# Patient Record
Sex: FEMALE | Race: WHITE | NOT HISPANIC OR LATINO | Employment: OTHER | URBAN - METROPOLITAN AREA
[De-identification: names, ages, dates, MRNs, and addresses within clinical notes are randomized per-mention and may not be internally consistent; named-entity substitution may affect disease eponyms.]

---

## 2017-07-31 PROBLEM — M81.0 OSTEOPOROSIS: Status: ACTIVE | Noted: 2017-07-31

## 2018-03-26 ENCOUNTER — TELEPHONE (OUTPATIENT)
Dept: FAMILY MEDICINE | Facility: CLINIC | Age: 83
End: 2018-03-26

## 2018-03-26 NOTE — TELEPHONE ENCOUNTER
----- Message from Lata Escobar sent at 3/26/2018 10:51 AM CDT -----  /Sterling is calling to talk to office concerning patient. He would like to talk to doctor for five minutes concerning patient. I did tell him that office was not accepting new patients but  is insisting that I send this message. Please call back to nikole at [phone].

## 2018-03-29 NOTE — TELEPHONE ENCOUNTER
"Returned pt husbands call. She has never seen Dr. Ruelas.  states he only would like to speak to provider as his wife case is "complicated". He would like Dr. Ruelas himself to call him to see if he is willing to see his wife.      "

## 2018-03-29 NOTE — TELEPHONE ENCOUNTER
----- Message from Saadia Ortiz sent at 3/29/2018 12:58 PM CDT -----  Contact: ,Sterling Zazueta  Patient's ,Sterling Amosalled asking for advice about a severe back issue.  Please call patient's  at [phone] or [phone]. Thanks!

## 2018-04-16 ENCOUNTER — PES CALL (OUTPATIENT)
Dept: ADMINISTRATIVE | Facility: CLINIC | Age: 83
End: 2018-04-16

## 2018-05-16 ENCOUNTER — OFFICE VISIT (OUTPATIENT)
Dept: FAMILY MEDICINE | Facility: CLINIC | Age: 83
End: 2018-05-16
Payer: MEDICARE

## 2018-05-16 VITALS
SYSTOLIC BLOOD PRESSURE: 116 MMHG | DIASTOLIC BLOOD PRESSURE: 60 MMHG | OXYGEN SATURATION: 97 % | BODY MASS INDEX: 24.62 KG/M2 | HEIGHT: 64 IN | WEIGHT: 144.19 LBS | HEART RATE: 67 BPM

## 2018-05-16 DIAGNOSIS — M54.9 CHRONIC BACK PAIN, UNSPECIFIED BACK LOCATION, UNSPECIFIED BACK PAIN LATERALITY: Chronic | ICD-10-CM

## 2018-05-16 DIAGNOSIS — M85.80 OSTEOPENIA, UNSPECIFIED LOCATION: ICD-10-CM

## 2018-05-16 DIAGNOSIS — N18.4 CKD (CHRONIC KIDNEY DISEASE) STAGE 4, GFR 15-29 ML/MIN: Chronic | ICD-10-CM

## 2018-05-16 DIAGNOSIS — M41.9 SCOLIOSIS OF LUMBAR SPINE, UNSPECIFIED SCOLIOSIS TYPE: ICD-10-CM

## 2018-05-16 DIAGNOSIS — M16.10 ARTHRITIS OF HIP: ICD-10-CM

## 2018-05-16 DIAGNOSIS — E78.5 HYPERLIPIDEMIA, UNSPECIFIED HYPERLIPIDEMIA TYPE: Chronic | ICD-10-CM

## 2018-05-16 DIAGNOSIS — M47.812 CERVICAL SPINE ARTHRITIS: ICD-10-CM

## 2018-05-16 DIAGNOSIS — I10 ESSENTIAL HYPERTENSION: Chronic | ICD-10-CM

## 2018-05-16 DIAGNOSIS — Z00.00 ENCOUNTER FOR PREVENTIVE HEALTH EXAMINATION: Primary | ICD-10-CM

## 2018-05-16 DIAGNOSIS — R41.3 MEMORY LOSS: ICD-10-CM

## 2018-05-16 DIAGNOSIS — M80.00XA AGE-RELATED OSTEOPOROSIS WITH CURRENT PATHOLOGICAL FRACTURE, INITIAL ENCOUNTER: ICD-10-CM

## 2018-05-16 DIAGNOSIS — I48.91 ATRIAL FIBRILLATION, UNSPECIFIED TYPE: ICD-10-CM

## 2018-05-16 DIAGNOSIS — G89.29 CHRONIC BACK PAIN, UNSPECIFIED BACK LOCATION, UNSPECIFIED BACK PAIN LATERALITY: Chronic | ICD-10-CM

## 2018-05-16 DIAGNOSIS — R41.89 COGNITIVE DECLINE: Chronic | ICD-10-CM

## 2018-05-16 DIAGNOSIS — M47.816 OSTEOARTHRITIS OF LUMBAR SPINE, UNSPECIFIED SPINAL OSTEOARTHRITIS COMPLICATION STATUS: ICD-10-CM

## 2018-05-16 PROCEDURE — G0439 PPPS, SUBSEQ VISIT: HCPCS | Mod: S$GLB,,, | Performed by: NURSE PRACTITIONER

## 2018-05-16 PROCEDURE — 99499 UNLISTED E&M SERVICE: CPT | Mod: S$GLB,,, | Performed by: NURSE PRACTITIONER

## 2018-05-16 PROCEDURE — 99999 PR PBB SHADOW E&M-EST. PATIENT-LVL V: CPT | Mod: PBBFAC,,, | Performed by: NURSE PRACTITIONER

## 2018-05-16 RX ORDER — TRAMADOL HYDROCHLORIDE AND ACETAMINOPHEN 37.5; 325 MG/1; MG/1
1 TABLET, FILM COATED ORAL EVERY 12 HOURS
COMMUNITY
End: 2018-09-24

## 2018-05-16 RX ORDER — LEVOTHYROXINE SODIUM 50 UG/1
50 TABLET ORAL DAILY
COMMUNITY

## 2018-05-16 RX ORDER — CINACALCET 30 MG/1
30 TABLET, FILM COATED ORAL
COMMUNITY
End: 2018-08-06 | Stop reason: ALTCHOICE

## 2018-05-16 RX ORDER — CEPHALEXIN 250 MG/1
250 CAPSULE ORAL
COMMUNITY

## 2018-05-16 RX ORDER — DONEPEZIL HYDROCHLORIDE 10 MG/1
10 TABLET, FILM COATED ORAL NIGHTLY
COMMUNITY
End: 2018-09-17 | Stop reason: SDUPTHER

## 2018-05-16 NOTE — PATIENT INSTRUCTIONS
Counseling and Referral of Other Preventative  (Italic type indicates deductible and co-insurance are waived)    Patient Name: Leigh Ann Zazueta  Today's Date: 5/16/2018    Health Maintenance       Date Due Completion Date    Zoster Vaccine 09/03/1990 ---    Pneumococcal (65+) (1 of 2 - PCV13) 09/03/1995 ---    Influenza Vaccine 08/01/2018 10/18/2016 (Done)    Override on 10/18/2016: Done (Done at [facility])    Lipid Panel 04/10/2022 4/10/2017    TETANUS VACCINE 02/07/2028 2/7/2018        No orders of the defined types were placed in this encounter.    The following information is provided to all patients.  This information is to help you find resources for any of the problems found today that may be affecting your health:                Living healthy guide: www.[facility].[place].gov      Understanding Diabetes: www.diabetes.org      Eating healthy: www.cdc.gov/healthyweight      CDC home safety checklist: www.cdc.gov/steadi/patient.html      Agency on Aging: www.goea.[place].[facility]      Alcoholics anonymous (AA): www.aa.org      Physical Activity: www.abigail.nih.gov/ji3yqis      Tobacco use: www.quitwithusla.org

## 2018-05-16 NOTE — PROGRESS NOTES
"Leigh Ann Zazueta presented for a  Medicare AWV and comprehensive Health Risk Assessment today. The following components were reviewed and updated:    · Medical history  · Family History  · Social history  · Allergies and Current Medications  · Health Risk Assessment  · Health Maintenance  · Care Team     ** See Completed Assessments for Annual Wellness Visit within the encounter summary.**       The following assessments were completed:  · Living Situation  · CAGE  · Depression Screening  · Timed Get Up and Go  · Whisper Test  · Cognitive Function Screening  · Nutrition Screening  · ADL Screening  · PAQ Screening    Vitals:    05/16/18 1125   BP: 116/60   BP Location: Left arm   Patient Position: Sitting   BP Method: Medium (Manual)   Pulse: 67   SpO2: 97%   Weight: 65.4 kg (144 lb 2.9 oz)   Height: 5' 4" (1.626 m)     Body mass index is 24.75 kg/m².  Physical Exam   Constitutional: She appears well-nourished. No distress.   Neck: Carotid bruit is not present.   Cardiovascular: Normal rate and regular rhythm.    No murmur heard.  Pulmonary/Chest: Effort normal and breath sounds normal. No respiratory distress.   Musculoskeletal: She exhibits no edema.   Neurological: She is alert. No cranial nerve deficit.   Skin: Skin is warm and dry.   Psychiatric: She has a normal mood and affect. She exhibits abnormal recent memory.         Diagnoses and health risks identified today and associated recommendations/orders:    1. Encounter for preventive health examination  Reviewed health maintenance and provided recommendations   Recommend pcv13 and shingrix, pt daughter will check with Dr Ramírez    2. Chronic back pain, unspecified back location, unspecified back pain laterality  Continue to monitor   Followed by Bentley.    - Ambulatory Referral to Physical/Occupational Therapy    3. Osteoarthritis of lumbar spine, unspecified spinal osteoarthritis complication status  Continue to monitor   Followed by Abraham Byrd MD " .    - Ambulatory Referral to Physical/Occupational Therapy    4. Scoliosis of lumbar spine, unspecified scoliosis type  Continue to monitor   Followed by Abraham Byrd MD .      5. Memory loss  Taking aricept and namenda  Followed by Wei.      6. Cognitive decline  Continue to monitor   Followed by Wei.      7. Atrial fibrillation, unspecified type  Taking amiodarone  Followed by Jodi.      8. Essential hypertension  Stable.     Followed by Jodi.      9. Hyperlipidemia, unspecified hyperlipidemia type  Continue to monitor   Followed by Jodi.      10. CKD (chronic kidney disease) stage 4, GFR 15-29 ml/min  Continue to monitor   Continue to encourage H2o intake  Followed by Jim.      11. Arthritis of hip  Stable.     Followed by Abraham Byrd MD .      12. Cervical spine arthritis  Continue to monitor   Followed by Abraham Byrd MD .      13. Osteopenia, unspecified location  Continue to monitor   Taking ca + vit d  Followed by Abraham Byrd MD .      14. Age-related osteoporosis with current pathological fracture, initial encounter  Taking Ca + Vit D  Followed by Abraham Byrd MD .      Daughter reports pt has back pain after strenuous activity and requests referral for PT.    Provided Leigh Ann with a 5-10 year written screening schedule and personal prevention plan. Recommendations were developed using the USPSTF age appropriate recommendations. Education, counseling, and referrals were provided as needed. After Visit Summary printed and given to patient which includes a list of additional screenings\tests needed.    Follow-up in about 1 year (around 5/16/2019).    Kaity Mcneill NP

## 2018-05-25 ENCOUNTER — TELEPHONE (OUTPATIENT)
Dept: FAMILY MEDICINE | Facility: CLINIC | Age: 83
End: 2018-05-25

## 2018-05-25 NOTE — TELEPHONE ENCOUNTER
----- Message from Leilani Winter sent at 5/25/2018 10:26 AM CDT -----  Contact:  Sterling  Requesting a call back from Marlene Mata in regards to his wife.  Doctor Thaddeus is referring them to Dr Monahan and he wants to discuss her issues.  Call back at [phone].  Thank you!

## 2018-05-31 NOTE — TELEPHONE ENCOUNTER
Tried to reach Mr Zazueta. Left msg for him to call back. Please give call to Marlene when he calls back.

## 2018-06-05 NOTE — TELEPHONE ENCOUNTER
Spoke w/ pt's . Appt made to estab care w/ Dr Monahan on 6/21/18 at 230pm, and advised to check in 15 min early. He repeated confirmation. Appt slip mailed.

## 2018-06-21 ENCOUNTER — OFFICE VISIT (OUTPATIENT)
Dept: FAMILY MEDICINE | Facility: CLINIC | Age: 83
End: 2018-06-21
Payer: MEDICARE

## 2018-06-21 ENCOUNTER — LAB VISIT (OUTPATIENT)
Dept: LAB | Facility: HOSPITAL | Age: 83
End: 2018-06-21
Attending: FAMILY MEDICINE
Payer: MEDICARE

## 2018-06-21 VITALS
BODY MASS INDEX: 24.32 KG/M2 | WEIGHT: 142.44 LBS | HEART RATE: 70 BPM | DIASTOLIC BLOOD PRESSURE: 56 MMHG | SYSTOLIC BLOOD PRESSURE: 104 MMHG | HEIGHT: 64 IN | TEMPERATURE: 99 F | OXYGEN SATURATION: 95 %

## 2018-06-21 DIAGNOSIS — M41.9 SCOLIOSIS OF LUMBAR SPINE, UNSPECIFIED SCOLIOSIS TYPE: ICD-10-CM

## 2018-06-21 DIAGNOSIS — N18.4 CKD (CHRONIC KIDNEY DISEASE) STAGE 4, GFR 15-29 ML/MIN: Chronic | ICD-10-CM

## 2018-06-21 DIAGNOSIS — G89.29 CHRONIC BACK PAIN, UNSPECIFIED BACK LOCATION, UNSPECIFIED BACK PAIN LATERALITY: Chronic | ICD-10-CM

## 2018-06-21 DIAGNOSIS — I48.91 ATRIAL FIBRILLATION, UNSPECIFIED TYPE: ICD-10-CM

## 2018-06-21 DIAGNOSIS — E07.9 THYROID DISORDER: ICD-10-CM

## 2018-06-21 DIAGNOSIS — R41.89 COGNITIVE DECLINE: Chronic | ICD-10-CM

## 2018-06-21 DIAGNOSIS — D64.9 ANEMIA, UNSPECIFIED TYPE: ICD-10-CM

## 2018-06-21 DIAGNOSIS — M47.816 OSTEOARTHRITIS OF LUMBAR SPINE, UNSPECIFIED SPINAL OSTEOARTHRITIS COMPLICATION STATUS: ICD-10-CM

## 2018-06-21 DIAGNOSIS — I10 ESSENTIAL HYPERTENSION: Chronic | ICD-10-CM

## 2018-06-21 DIAGNOSIS — Z00.00 HEALTH CARE MAINTENANCE: Primary | ICD-10-CM

## 2018-06-21 DIAGNOSIS — M54.9 CHRONIC BACK PAIN, UNSPECIFIED BACK LOCATION, UNSPECIFIED BACK PAIN LATERALITY: Chronic | ICD-10-CM

## 2018-06-21 LAB
ALBUMIN SERPL BCP-MCNC: 3.7 G/DL
ALP SERPL-CCNC: 66 U/L
ALT SERPL W/O P-5'-P-CCNC: 10 U/L
ANION GAP SERPL CALC-SCNC: 6 MMOL/L
AST SERPL-CCNC: 18 U/L
BASOPHILS # BLD AUTO: 0.06 K/UL
BASOPHILS NFR BLD: 0.9 %
BILIRUB SERPL-MCNC: 0.5 MG/DL
BUN SERPL-MCNC: 25 MG/DL
CALCIUM SERPL-MCNC: 9.6 MG/DL
CHLORIDE SERPL-SCNC: 104 MMOL/L
CO2 SERPL-SCNC: 28 MMOL/L
CREAT SERPL-MCNC: 1.8 MG/DL
DIFFERENTIAL METHOD: ABNORMAL
EOSINOPHIL # BLD AUTO: 0.4 K/UL
EOSINOPHIL NFR BLD: 5.3 %
ERYTHROCYTE [DISTWIDTH] IN BLOOD BY AUTOMATED COUNT: 13.3 %
EST. GFR  (AFRICAN AMERICAN): 28.8 ML/MIN/1.73 M^2
EST. GFR  (NON AFRICAN AMERICAN): 24.9 ML/MIN/1.73 M^2
GLUCOSE SERPL-MCNC: 96 MG/DL
HCT VFR BLD AUTO: 41.1 %
HGB BLD-MCNC: 13.4 G/DL
IMM GRANULOCYTES # BLD AUTO: 0.03 K/UL
IMM GRANULOCYTES NFR BLD AUTO: 0.5 %
IRON SERPL-MCNC: 91 UG/DL
LYMPHOCYTES # BLD AUTO: 1.5 K/UL
LYMPHOCYTES NFR BLD: 22.4 %
MCH RBC QN AUTO: 33.8 PG
MCHC RBC AUTO-ENTMCNC: 32.6 G/DL
MCV RBC AUTO: 104 FL
MONOCYTES # BLD AUTO: 0.6 K/UL
MONOCYTES NFR BLD: 8.4 %
NEUTROPHILS # BLD AUTO: 4.1 K/UL
NEUTROPHILS NFR BLD: 62.5 %
NRBC BLD-RTO: 0 /100 WBC
PLATELET # BLD AUTO: 156 K/UL
PMV BLD AUTO: 10.6 FL
POTASSIUM SERPL-SCNC: 4.1 MMOL/L
PROT SERPL-MCNC: 6.9 G/DL
RBC # BLD AUTO: 3.97 M/UL
SATURATED IRON: 25 %
SODIUM SERPL-SCNC: 138 MMOL/L
TOTAL IRON BINDING CAPACITY: 360 UG/DL
TRANSFERRIN SERPL-MCNC: 243 MG/DL
TSH SERPL DL<=0.005 MIU/L-ACNC: 1.47 UIU/ML
WBC # BLD AUTO: 6.57 K/UL

## 2018-06-21 PROCEDURE — 83540 ASSAY OF IRON: CPT

## 2018-06-21 PROCEDURE — 85025 COMPLETE CBC W/AUTO DIFF WBC: CPT

## 2018-06-21 PROCEDURE — 84443 ASSAY THYROID STIM HORMONE: CPT

## 2018-06-21 PROCEDURE — 99214 OFFICE O/P EST MOD 30 MIN: CPT | Mod: S$GLB,,, | Performed by: FAMILY MEDICINE

## 2018-06-21 PROCEDURE — 80053 COMPREHEN METABOLIC PANEL: CPT

## 2018-06-21 PROCEDURE — 99499 UNLISTED E&M SERVICE: CPT | Mod: S$GLB,,, | Performed by: FAMILY MEDICINE

## 2018-06-21 PROCEDURE — 36415 COLL VENOUS BLD VENIPUNCTURE: CPT | Mod: PN

## 2018-06-21 PROCEDURE — 99999 PR PBB SHADOW E&M-EST. PATIENT-LVL III: CPT | Mod: PBBFAC,,, | Performed by: FAMILY MEDICINE

## 2018-06-21 RX ORDER — AMLODIPINE BESYLATE 5 MG/1
5 TABLET ORAL DAILY
COMMUNITY
End: 2018-09-10 | Stop reason: SDUPTHER

## 2018-06-21 RX ORDER — TALC
POWDER (GRAM) TOPICAL NIGHTLY
COMMUNITY

## 2018-06-21 RX ORDER — IRBESARTAN 300 MG/1
300 TABLET ORAL NIGHTLY
COMMUNITY
End: 2018-09-28 | Stop reason: SDUPTHER

## 2018-06-21 RX ORDER — TRAMADOL HYDROCHLORIDE 50 MG/1
TABLET ORAL
COMMUNITY
Start: 2018-06-18 | End: 2018-06-21 | Stop reason: SDUPTHER

## 2018-06-21 RX ORDER — MEMANTINE HYDROCHLORIDE 14 MG/1
CAPSULE, EXTENDED RELEASE ORAL DAILY
COMMUNITY

## 2018-06-21 RX ORDER — CHOLECALCIFEROL (VITAMIN D3) 25 MCG
2 TABLET,CHEWABLE ORAL DAILY
COMMUNITY
End: 2018-09-24

## 2018-06-21 RX ORDER — VIT C/E/ZN/COPPR/LUTEIN/ZEAXAN 250MG-90MG
5000 CAPSULE ORAL DAILY
COMMUNITY

## 2018-06-21 RX ORDER — DOXAZOSIN 2 MG/1
2 TABLET ORAL NIGHTLY
COMMUNITY
End: 2018-08-06 | Stop reason: SDUPTHER

## 2018-06-21 NOTE — PROGRESS NOTES
Subjective:       Patient ID: Leigh Ann Zazueta is a 87 y.o. female.    Chief Complaint: Annual Exam    HPI   Leigh Ann Zazueta is an 88yo female here today to establish care. Her  notes that she has a a history of memory loss and reports she was diagnosed with Alzheimer's. She sees Dr Aly Schrader and has been on aricept and namenda.  Not sure how much it is helping. She reports feeling well overall and having no complaints. She does ask repeated questions throughout the visit today. Her  also notes that she complains of hip pain and occasional back pain though she does not seem to recall this. She has chronic back issues and had a fusion of the cervical spine. She denies any pain today. She is very pleasant but history is difficult to obtain due to her memory difficulties and her  is not able to answer some of the questions. One of their six children helps with their healthcare but she is not present today. Reviewed PMHx, PSHx, FHx, and social history with the patient today.     HTN - stable, does not check her BP    Atrial Fibrillation - follows with Dr. Zapata, currently asymptomatic    CKD - follows with Dr. Fernandez    Chronic Back Pain - history of cervical spine fusion and scoliosis. Reports no pain at this time.    Review of Systems   Constitutional: Negative for chills, fatigue and fever.   HENT: Negative for congestion and rhinorrhea.    Eyes: Negative for visual disturbance.   Respiratory: Negative for cough and shortness of breath.    Cardiovascular: Negative for chest pain, palpitations and leg swelling.   Gastrointestinal: Negative for abdominal pain, constipation, diarrhea and nausea.   Genitourinary: Negative for difficulty urinating, frequency and urgency.   Musculoskeletal: Negative for arthralgias, back pain, myalgias and neck pain.   Neurological: Negative for dizziness, weakness, light-headedness, numbness and headaches.       Objective:       Vitals:    06/21/18 1448   BP: (!)  104/56   Pulse: 70   Temp: 98.7 °F (37.1 °C)       Physical Exam   Constitutional: She appears well-developed and well-nourished. No distress.   HENT:   Head: Normocephalic and atraumatic.   Mouth/Throat: Oropharynx is clear and moist. No oropharyngeal exudate.   TMs visualized, non-erythematous, non-bulging, non-retracting.   Eyes: Conjunctivae are normal. Pupils are equal, round, and reactive to light.   Neck: Normal range of motion. Neck supple.   Cardiovascular: Normal rate, regular rhythm, normal heart sounds and intact distal pulses.    Pulmonary/Chest: Effort normal and breath sounds normal. She has no wheezes. She has no rales.   Abdominal: Soft. Bowel sounds are normal. She exhibits no distension. There is no tenderness.   Musculoskeletal: She exhibits no edema or deformity.   Spinal Exam: Normal gait. Scoliosis noted with posture. Non-tender to palpation and percussion of the spine or paraspinals. ROM is grossly intact. Straight leg raise negative.   Hip Exam: No tenderness to palpation. ROM is grossly intact. She reports no pain on exam.    Lymphadenopathy:     She has no cervical adenopathy.   Neurological: She is alert.   Patient is not oriented to time. She knows she is in the doctor's office but cannot state the town.   Skin: Skin is warm and dry. She is not diaphoretic. No erythema.   Psychiatric: She has a normal mood and affect. Her behavior is normal.       Assessment:       1. Health care maintenance    2. Essential hypertension    3. CKD (chronic kidney disease) stage 4, GFR 15-29 ml/min    4. Atrial fibrillation, unspecified type    5. Chronic back pain, unspecified back location, unspecified back pain laterality    6. Thyroid disorder    7. Anemia, unspecified type    8. Cognitive decline        Plan:       1. Health care maintenance  Patient is here to establish care today.   Health Maintenance UTD.  Immunizations UTD - except pneumoccocal and zoster, will hold off for now and discuss at next  appointment   Will obtain labs today including CBC, CMP, TSH, and Iron panel and evaluate.     2. Essential hypertension  Stable in office today. Blood pressure has been on the low end at her last two visits.  Will cease doxazosin for now and determine need at next appointment.     3. CKD (chronic kidney disease) stage 4, GFR 15-29 ml/min  Follows with Dr. Fernandez. Improved on previous labwork with Cr of 1.37 and GFR of 35.   Will obtain a CMP today to evaluate.   - Comprehensive metabolic panel; Future    4. Atrial fibrillation, unspecified type  Follows with Dr. Zapata.   Continue home medications - Amiodarone 100mg and ASA 81.     5. Chronic back pain, unspecified back location, unspecified back pain laterality  Patient with history of back pain. Reports none at this time.     6. Thyroid disorder  Patient with history of thyroid disorder. Currently on levothyroxine 50mcg.   Will obtain repeat TSH.   - TSH; Future    7. Anemia, unspecified type  Patient with Hemoglobin of 10.4, previous iron panel and ferritin unremarkable.  Will obtain repeat CBC and iron panel to further evaluate. Will obtain FitKit to evaluate for a possible cause.   - CBC auto differential; Future  - Iron and TIBC; Future  - Fecal Immunochemical Test (iFOBT); Future    8. Memory Loss  Chronic ongoing issue per patient's .   Currently on Donepezil 10mg and Namenda XR 14mg.  I spoke with the daughter, Carmen.  Her mom had a knee arthroscope and a total hip replacement shortly after her spinal fusion.  She thinks she has been on Namenda and Aricept approximately 2 years and it seems to have helped with her agitation and anxiety but not with her memory.  The home health aide does acknowledge that she has some pain. She benefits from the tramadol twice a day.  She does have a history of spinal stenosis.  The Keflex twice a week is for prophylaxis for frequent urinary infections.  She has had an iron infusion in the past.  We discussed the  stopping of the doxazosin.

## 2018-06-27 ENCOUNTER — TELEPHONE (OUTPATIENT)
Dept: FAMILY MEDICINE | Facility: CLINIC | Age: 83
End: 2018-06-27

## 2018-06-27 NOTE — TELEPHONE ENCOUNTER
I discussed lab results with her daughter.  Everything seemed okay except for macrocytosis and an elevated creatinine of 1.8.  Her low blood pressure and maybe some dehydration may be influencing that.  We should repeat a BMP in 1 month.  She should continue on vitamin B12 supplements daily.  I will send the lab results in a letter

## 2018-09-24 ENCOUNTER — OFFICE VISIT (OUTPATIENT)
Dept: FAMILY MEDICINE | Facility: CLINIC | Age: 83
End: 2018-09-24
Payer: MEDICARE

## 2018-09-24 VITALS
TEMPERATURE: 98 F | HEART RATE: 66 BPM | WEIGHT: 147.69 LBS | HEIGHT: 65 IN | BODY MASS INDEX: 24.61 KG/M2 | SYSTOLIC BLOOD PRESSURE: 120 MMHG | DIASTOLIC BLOOD PRESSURE: 62 MMHG

## 2018-09-24 DIAGNOSIS — I10 ESSENTIAL HYPERTENSION: Primary | Chronic | ICD-10-CM

## 2018-09-24 DIAGNOSIS — R41.89 COGNITIVE DECLINE: Chronic | ICD-10-CM

## 2018-09-24 DIAGNOSIS — M47.816 OSTEOARTHRITIS OF LUMBAR SPINE, UNSPECIFIED SPINAL OSTEOARTHRITIS COMPLICATION STATUS: ICD-10-CM

## 2018-09-24 DIAGNOSIS — M41.9 SCOLIOSIS OF LUMBAR SPINE, UNSPECIFIED SCOLIOSIS TYPE: ICD-10-CM

## 2018-09-24 PROCEDURE — 99999 PR PBB SHADOW E&M-EST. PATIENT-LVL III: CPT | Mod: PBBFAC,,, | Performed by: FAMILY MEDICINE

## 2018-09-24 PROCEDURE — 99214 OFFICE O/P EST MOD 30 MIN: CPT | Mod: S$PBB,,, | Performed by: FAMILY MEDICINE

## 2018-09-24 PROCEDURE — 1101F PT FALLS ASSESS-DOCD LE1/YR: CPT | Mod: CPTII,,, | Performed by: FAMILY MEDICINE

## 2018-09-24 PROCEDURE — 99213 OFFICE O/P EST LOW 20 MIN: CPT | Mod: PBBFAC,PN | Performed by: FAMILY MEDICINE

## 2018-09-24 RX ORDER — UBIDECARENONE 75 MG
500 CAPSULE ORAL DAILY
COMMUNITY

## 2018-09-24 RX ORDER — TRAMADOL HYDROCHLORIDE AND ACETAMINOPHEN 37.5; 325 MG/1; MG/1
1 TABLET, FILM COATED ORAL 3 TIMES DAILY PRN
Qty: 90 TABLET | Refills: 2 | Status: SHIPPED | OUTPATIENT
Start: 2018-09-24 | End: 2018-12-31 | Stop reason: SDUPTHER

## 2018-09-24 NOTE — PROGRESS NOTES
"Subjective:       Patient ID: Leigh Ann Zazueta is a 88 y.o. female.    Chief Complaint: Follow-up (3 mo f/u.)    She is here with her  and her sitter.  She has significant dementia on 2 medications.  She has a long past history of spine problems with a history of cervical surgery as well as lumbar disc disease.  I reviewed an MRI of the lumbar spine from 2015.  During that period of time she had also had a pelvic fracture.  Her  expressed concern about the fact that she would only walk a little ways and then sit down.  She does not admit to a pain today but the sitter says that she relates some back pain often on.  She takes ultrasound set twice a day and sometimes needs to take a CT abdomen if in in between.  She does not have the best appetite.  She tends to snack.  Only small amounts of meals.  She has not lost any weight.  She has not fallen in about 6 months.      Review of Systems   Constitutional: Positive for appetite change. Negative for unexpected weight change.   Respiratory: Negative for cough and shortness of breath.    Cardiovascular: Negative for chest pain.   Musculoskeletal: Positive for back pain.   Neurological: Negative for weakness.   Psychiatric/Behavioral: Positive for confusion.       Objective:     Blood pressure 120/62, pulse 66, temperature 97.8 °F (36.6 °C), temperature source Oral, height 5' 5" (1.651 m), weight 67 kg (147 lb 11.3 oz).      Physical Exam   Constitutional: She appears well-developed and well-nourished. No distress.   Cardiovascular:   She has some irregularity to her heart rhythm   Pulmonary/Chest: Effort normal and breath sounds normal.   Abdominal: Soft. Bowel sounds are normal. She exhibits no distension and no mass. There is no tenderness.   Musculoskeletal:   She has significant kyphoscoliosis.  The back is nontender.  I walked with her in the [place].  She phillip out of the chair without assistance and had a medium gait but she kept looking at the floor. "   Neurological: She is alert.   Ankle strength is intact.  Sensation to light touch is intact.       Assessment:       1. Essential hypertension    2. Osteoarthritis of lumbar spine, unspecified spinal osteoarthritis complication status    3. Scoliosis of lumbar spine, unspecified scoliosis type    4. Cognitive decline        Plan:       Continue medications.  Increased ultra set so that she can take t.i.d. if needed.  We discussed her diet.  Follow-up in 3 months.

## 2018-12-31 ENCOUNTER — OFFICE VISIT (OUTPATIENT)
Dept: FAMILY MEDICINE | Facility: CLINIC | Age: 83
End: 2018-12-31
Payer: MEDICARE

## 2018-12-31 VITALS
HEIGHT: 65 IN | OXYGEN SATURATION: 98 % | WEIGHT: 149.06 LBS | SYSTOLIC BLOOD PRESSURE: 116 MMHG | DIASTOLIC BLOOD PRESSURE: 70 MMHG | BODY MASS INDEX: 24.83 KG/M2 | TEMPERATURE: 98 F | HEART RATE: 77 BPM | RESPIRATION RATE: 16 BRPM

## 2018-12-31 DIAGNOSIS — M47.816 OSTEOARTHRITIS OF LUMBAR SPINE, UNSPECIFIED SPINAL OSTEOARTHRITIS COMPLICATION STATUS: ICD-10-CM

## 2018-12-31 DIAGNOSIS — I10 ESSENTIAL HYPERTENSION: Primary | Chronic | ICD-10-CM

## 2018-12-31 DIAGNOSIS — F02.80 LATE ONSET ALZHEIMER'S DISEASE WITHOUT BEHAVIORAL DISTURBANCE: ICD-10-CM

## 2018-12-31 DIAGNOSIS — M54.9 CHRONIC BACK PAIN, UNSPECIFIED BACK LOCATION, UNSPECIFIED BACK PAIN LATERALITY: Chronic | ICD-10-CM

## 2018-12-31 DIAGNOSIS — G30.1 LATE ONSET ALZHEIMER'S DISEASE WITHOUT BEHAVIORAL DISTURBANCE: ICD-10-CM

## 2018-12-31 DIAGNOSIS — G89.29 CHRONIC BACK PAIN, UNSPECIFIED BACK LOCATION, UNSPECIFIED BACK PAIN LATERALITY: Chronic | ICD-10-CM

## 2018-12-31 PROCEDURE — 99999 PR PBB SHADOW E&M-EST. PATIENT-LVL III: CPT | Mod: PBBFAC,,, | Performed by: FAMILY MEDICINE

## 2018-12-31 PROCEDURE — 1100F PTFALLS ASSESS-DOCD GE2>/YR: CPT | Mod: CPTII,S$GLB,, | Performed by: FAMILY MEDICINE

## 2018-12-31 PROCEDURE — 3288F FALL RISK ASSESSMENT DOCD: CPT | Mod: CPTII,S$GLB,, | Performed by: FAMILY MEDICINE

## 2018-12-31 PROCEDURE — 99214 OFFICE O/P EST MOD 30 MIN: CPT | Mod: S$GLB,,, | Performed by: FAMILY MEDICINE

## 2018-12-31 RX ORDER — TRAMADOL HYDROCHLORIDE AND ACETAMINOPHEN 37.5; 325 MG/1; MG/1
1 TABLET, FILM COATED ORAL 3 TIMES DAILY PRN
Qty: 90 TABLET | Refills: 2 | Status: SHIPPED | OUTPATIENT
Start: 2018-12-31

## 2018-12-31 RX ORDER — TRAMADOL HYDROCHLORIDE 50 MG/1
TABLET ORAL
COMMUNITY
Start: 2018-11-21 | End: 2018-12-31 | Stop reason: ALTCHOICE

## 2018-12-31 RX ORDER — ESCITALOPRAM OXALATE 5 MG/1
TABLET ORAL
COMMUNITY
Start: 2018-12-28

## 2018-12-31 RX ORDER — CINACALCET 30 MG/1
TABLET, FILM COATED ORAL
COMMUNITY

## 2018-12-31 NOTE — PROGRESS NOTES
"Subjective:       Patient ID: Leigh Ann Zazueta is a 88 y.o. female.    Chief Complaint: Follow-up    She is here with her daughter from [place].  She has ongoing Alzheimer's.  She seems pleasant and happy.  She has some limitations related to her back.  She has been taking either tramadol 50 mg twice a day or ultracet 2-3 times a day.  We talked about getting her controlled medications from 1 physician.  She is planning to reconsult her neurosurgeon in [place].  He performed a C1-2 fusion many years ago.  She has hypertension was is fairly well controlled.  I reviewed her lab work from June.  I surveyed her ADLs.  She does not cook or clean.  She is able to dress herself.  She sometimes needs to be reminded to bathe.  No abnormal outbursts.  She did have a recent fall.      Review of Systems   Constitutional: Negative for fever and unexpected weight change.   Respiratory: Negative for cough and shortness of breath.        Objective:     Blood pressure 116/70, pulse 77, temperature 98.3 °F (36.8 °C), temperature source Oral, resp. rate 16, height 5' 5" (1.651 m), weight 67.6 kg (149 lb 0.5 oz), SpO2 98 %.      Physical Exam   Constitutional: She appears well-developed and well-nourished. No distress.   Cardiovascular:   Irregular rhythm. No murmur heard.   Pulmonary/Chest: Effort normal and breath sounds normal. No respiratory distress.   Musculoskeletal: She exhibits no edema.   Neurological: She is alert.   Skin:   She has a bruise on her left forearm and her left 3rd toe.       Assessment:       1. Essential hypertension    2. Late onset Alzheimer's disease without behavioral disturbance    3. Chronic back pain, unspecified back location, unspecified back pain laterality    4. Osteoarthritis of lumbar spine, unspecified spinal osteoarthritis complication status        Plan:       I will refill her tramadol/acetaminophen.  She can take 2-3 per day.  Follow-up in 3 months.      "

## 2019-02-20 ENCOUNTER — TELEPHONE (OUTPATIENT)
Dept: FAMILY MEDICINE | Facility: CLINIC | Age: 84
End: 2019-02-20

## 2020-08-12 ENCOUNTER — PES CALL (OUTPATIENT)
Dept: ADMINISTRATIVE | Facility: CLINIC | Age: 85
End: 2020-08-12